# Patient Record
Sex: FEMALE | Race: WHITE | URBAN - METROPOLITAN AREA
[De-identification: names, ages, dates, MRNs, and addresses within clinical notes are randomized per-mention and may not be internally consistent; named-entity substitution may affect disease eponyms.]

---

## 2022-04-07 ENCOUNTER — APPOINTMENT (RX ONLY)
Dept: URBAN - METROPOLITAN AREA CLINIC 86 | Facility: CLINIC | Age: 77
Setting detail: DERMATOLOGY
End: 2022-04-07

## 2022-04-07 DIAGNOSIS — L82.1 OTHER SEBORRHEIC KERATOSIS: ICD-10-CM

## 2022-04-07 DIAGNOSIS — L57.0 ACTINIC KERATOSIS: ICD-10-CM

## 2022-04-07 DIAGNOSIS — L81.4 OTHER MELANIN HYPERPIGMENTATION: ICD-10-CM

## 2022-04-07 PROCEDURE — ? COUNSELING

## 2022-04-07 PROCEDURE — 99203 OFFICE O/P NEW LOW 30 MIN: CPT | Mod: 25

## 2022-04-07 PROCEDURE — 17000 DESTRUCT PREMALG LESION: CPT

## 2022-04-07 PROCEDURE — ? ADDITIONAL NOTES

## 2022-04-07 PROCEDURE — ? FULL BODY SKIN EXAM - DECLINED

## 2022-04-07 PROCEDURE — ? LIQUID NITROGEN

## 2022-04-07 PROCEDURE — 17003 DESTRUCT PREMALG LES 2-14: CPT

## 2022-04-07 ASSESSMENT — LOCATION SIMPLE DESCRIPTION DERM
LOCATION SIMPLE: LEFT FOREHEAD
LOCATION SIMPLE: LEFT CHEEK
LOCATION SIMPLE: RIGHT TEMPLE
LOCATION SIMPLE: RIGHT CHEEK
LOCATION SIMPLE: RIGHT NOSE

## 2022-04-07 ASSESSMENT — LOCATION ZONE DERM
LOCATION ZONE: NOSE
LOCATION ZONE: FACE

## 2022-04-07 ASSESSMENT — LOCATION DETAILED DESCRIPTION DERM
LOCATION DETAILED: RIGHT CENTRAL TEMPLE
LOCATION DETAILED: RIGHT NASAL SIDEWALL
LOCATION DETAILED: RIGHT INFERIOR CENTRAL MALAR CHEEK
LOCATION DETAILED: LEFT INFERIOR FOREHEAD
LOCATION DETAILED: LEFT INFERIOR CENTRAL MALAR CHEEK

## 2022-05-26 ENCOUNTER — APPOINTMENT (RX ONLY)
Dept: URBAN - METROPOLITAN AREA CLINIC 86 | Facility: CLINIC | Age: 77
Setting detail: DERMATOLOGY
End: 2022-05-26

## 2022-05-26 DIAGNOSIS — L57.0 ACTINIC KERATOSIS: ICD-10-CM | Status: RESOLVED

## 2022-05-26 DIAGNOSIS — L82.1 OTHER SEBORRHEIC KERATOSIS: ICD-10-CM

## 2022-05-26 PROCEDURE — ? FULL BODY SKIN EXAM - DECLINED

## 2022-05-26 PROCEDURE — 99213 OFFICE O/P EST LOW 20 MIN: CPT

## 2022-05-26 PROCEDURE — ? COUNSELING

## 2022-05-26 PROCEDURE — ? DEFER

## 2022-05-26 PROCEDURE — ? ADDITIONAL NOTES

## 2022-05-26 ASSESSMENT — LOCATION SIMPLE DESCRIPTION DERM
LOCATION SIMPLE: LEFT FOREHEAD
LOCATION SIMPLE: RIGHT NOSE
LOCATION SIMPLE: RIGHT TEMPLE

## 2022-05-26 ASSESSMENT — LOCATION DETAILED DESCRIPTION DERM
LOCATION DETAILED: RIGHT INFERIOR TEMPLE
LOCATION DETAILED: LEFT INFERIOR FOREHEAD
LOCATION DETAILED: RIGHT NASAL SIDEWALL

## 2022-05-26 ASSESSMENT — LOCATION ZONE DERM
LOCATION ZONE: NOSE
LOCATION ZONE: FACE

## 2023-04-10 NOTE — PROCEDURE: ADDITIONAL NOTES
Detail Level: Simple
Render Risk Assessment In Note?: no
Additional Notes: Patient consent was obtained to proceed with the visit and recommended plan of care after discussion of all risks and benefits, including the risks of COVID-19 exposure.
Doxycycline Pregnancy And Lactation Text: This medication is Pregnancy Category D and not consider safe during pregnancy. It is also excreted in breast milk but is considered safe for shorter treatment courses.

## 2023-09-12 ENCOUNTER — NEW PATIENT (OUTPATIENT)
Dept: URBAN - METROPOLITAN AREA CLINIC 50 | Facility: LOCATION | Age: 78
End: 2023-09-12

## 2023-09-12 DIAGNOSIS — H35.3222: ICD-10-CM

## 2023-09-12 DIAGNOSIS — H35.3111: ICD-10-CM

## 2023-09-12 DIAGNOSIS — H25.813: ICD-10-CM

## 2023-09-12 PROCEDURE — 99204 OFFICE O/P NEW MOD 45 MIN: CPT

## 2023-09-12 PROCEDURE — 92015 DETERMINE REFRACTIVE STATE: CPT

## 2023-09-12 PROCEDURE — 92134 CPTRZ OPH DX IMG PST SGM RTA: CPT

## 2023-09-12 ASSESSMENT — TONOMETRY
OS_IOP_MMHG: 16
OD_IOP_MMHG: 16

## 2023-09-12 ASSESSMENT — VISUAL ACUITY
OD_CC: 20/40
OD_GLARE: 20/40
OD_GLARE: 20/50
OS_CC: CF 2FT

## 2023-10-03 ENCOUNTER — PRE-OP/H&P (OUTPATIENT)
Dept: URBAN - METROPOLITAN AREA CLINIC 50 | Facility: LOCATION | Age: 78
End: 2023-10-03

## 2023-10-03 DIAGNOSIS — H25.813: ICD-10-CM

## 2023-10-03 PROCEDURE — PREOP PRE OP VISIT

## 2023-10-03 PROCEDURE — 92025-3 CORNEAL TOPO, REFUSED

## 2023-10-03 PROCEDURE — 92136 OPHTHALMIC BIOMETRY: CPT

## 2023-10-03 ASSESSMENT — KERATOMETRY
OD_AXISANGLE2_DEGREES: 108
OD_K2POWER_DIOPTERS: 44.37
OS_K1POWER_DIOPTERS: 46.00
OD_K1POWER_DIOPTERS: 46.00
OS_AXISANGLE2_DEGREES: 20
OS_K2POWER_DIOPTERS: 44.87
OS_AXISANGLE_DEGREES: 110
OD_AXISANGLE_DEGREES: 18

## 2023-10-03 ASSESSMENT — VISUAL ACUITY
OU_CC: 20/30-1
OD_CC: 20/30
OS_CC: CF 3FT

## 2023-10-03 ASSESSMENT — TONOMETRY
OD_IOP_MMHG: 18
OS_IOP_MMHG: 16

## 2023-10-10 ASSESSMENT — KERATOMETRY
OS_AXISANGLE_DEGREES: 110
OS_AXISANGLE2_DEGREES: 20
OS_K2POWER_DIOPTERS: 44.87
OD_K1POWER_DIOPTERS: 46.00
OD_AXISANGLE_DEGREES: 18
OD_K2POWER_DIOPTERS: 44.37
OS_K1POWER_DIOPTERS: 46.00
OD_AXISANGLE2_DEGREES: 108

## 2023-10-11 ENCOUNTER — SURGERY/PROCEDURE (OUTPATIENT)
Dept: URBAN - METROPOLITAN AREA SURGERY 16 | Facility: SURGERY | Age: 78
End: 2023-10-11

## 2023-10-11 ENCOUNTER — POST-OP (OUTPATIENT)
Dept: URBAN - METROPOLITAN AREA CLINIC 48 | Facility: LOCATION | Age: 78
End: 2023-10-11

## 2023-10-11 DIAGNOSIS — Z98.42: ICD-10-CM

## 2023-10-11 DIAGNOSIS — H25.812: ICD-10-CM

## 2023-10-11 DIAGNOSIS — Z96.1: ICD-10-CM

## 2023-10-11 PROCEDURE — 66984 XCAPSL CTRC RMVL W/O ECP: CPT

## 2023-10-11 ASSESSMENT — VISUAL ACUITY: OS_SC: CF 5FT

## 2023-10-11 ASSESSMENT — KERATOMETRY
OD_K1POWER_DIOPTERS: 46.00
OS_AXISANGLE2_DEGREES: 20
OD_AXISANGLE_DEGREES: 18
OD_AXISANGLE2_DEGREES: 108
OD_K2POWER_DIOPTERS: 44.37
OS_K2POWER_DIOPTERS: 44.87
OS_K1POWER_DIOPTERS: 46.00
OS_AXISANGLE_DEGREES: 110

## 2023-10-11 ASSESSMENT — TONOMETRY: OS_IOP_MMHG: 12

## 2023-10-19 ENCOUNTER — POST OP/EVAL OF SECOND EYE (OUTPATIENT)
Dept: URBAN - METROPOLITAN AREA CLINIC 52 | Facility: CLINIC | Age: 78
End: 2023-10-19

## 2023-10-19 DIAGNOSIS — H25.811: ICD-10-CM

## 2023-10-19 PROCEDURE — 99213 OFFICE O/P EST LOW 20 MIN: CPT | Mod: 24

## 2023-10-19 ASSESSMENT — TONOMETRY
OD_IOP_MMHG: 17
OS_IOP_MMHG: 17

## 2023-10-19 ASSESSMENT — KERATOMETRY
OD_K1POWER_DIOPTERS: 46.00
OS_K2POWER_DIOPTERS: 44.87
OD_AXISANGLE2_DEGREES: 108
OS_K1POWER_DIOPTERS: 46.00
OS_AXISANGLE2_DEGREES: 20
OD_AXISANGLE_DEGREES: 18
OD_K2POWER_DIOPTERS: 44.37
OS_AXISANGLE_DEGREES: 110

## 2023-10-19 ASSESSMENT — VISUAL ACUITY
OS_SC: CF 4FT
OD_SC: 20/200

## 2023-10-24 ASSESSMENT — KERATOMETRY
OS_K1POWER_DIOPTERS: 46.00
OD_K1POWER_DIOPTERS: 46.00
OS_K2POWER_DIOPTERS: 44.87
OD_AXISANGLE2_DEGREES: 108
OD_AXISANGLE_DEGREES: 18
OS_AXISANGLE_DEGREES: 110
OS_AXISANGLE2_DEGREES: 20
OD_K2POWER_DIOPTERS: 44.37

## 2023-10-25 ENCOUNTER — POST-OP (OUTPATIENT)
Dept: URBAN - METROPOLITAN AREA CLINIC 48 | Facility: LOCATION | Age: 78
End: 2023-10-25

## 2023-10-25 ENCOUNTER — SURGERY/PROCEDURE (OUTPATIENT)
Dept: URBAN - METROPOLITAN AREA SURGERY 16 | Facility: SURGERY | Age: 78
End: 2023-10-25

## 2023-10-25 DIAGNOSIS — Z98.41: ICD-10-CM

## 2023-10-25 DIAGNOSIS — Z96.1: ICD-10-CM

## 2023-10-25 DIAGNOSIS — Z98.42: ICD-10-CM

## 2023-10-25 DIAGNOSIS — H25.811: ICD-10-CM

## 2023-10-25 PROCEDURE — 99199PCV CUSTOM VISION

## 2023-10-25 PROCEDURE — 66984CV REMOVE CATARACT, INSERT LENS, CUSTOM VISION: Mod: 79,RT

## 2023-10-25 ASSESSMENT — KERATOMETRY
OD_K2POWER_DIOPTERS: 44.37
OS_AXISANGLE2_DEGREES: 20
OS_AXISANGLE_DEGREES: 110
OS_K2POWER_DIOPTERS: 44.87
OS_K1POWER_DIOPTERS: 46.00
OD_AXISANGLE2_DEGREES: 108
OD_K1POWER_DIOPTERS: 46.00
OD_AXISANGLE_DEGREES: 18

## 2023-10-25 ASSESSMENT — VISUAL ACUITY: OD_SC: 20/70-1

## 2023-10-25 ASSESSMENT — TONOMETRY: OD_IOP_MMHG: 14

## 2023-10-31 ENCOUNTER — POST-OP (OUTPATIENT)
Dept: URBAN - METROPOLITAN AREA CLINIC 50 | Facility: LOCATION | Age: 78
End: 2023-10-31

## 2023-10-31 DIAGNOSIS — Z98.41: ICD-10-CM

## 2023-10-31 DIAGNOSIS — Z96.1: ICD-10-CM

## 2023-10-31 DIAGNOSIS — H35.3111: ICD-10-CM

## 2023-10-31 PROCEDURE — 92134 CPTRZ OPH DX IMG PST SGM RTA: CPT

## 2023-10-31 PROCEDURE — 99024 POSTOP FOLLOW-UP VISIT: CPT

## 2023-10-31 ASSESSMENT — TONOMETRY
OD_IOP_MMHG: 17
OS_IOP_MMHG: 16

## 2023-10-31 ASSESSMENT — VISUAL ACUITY
OD_SC: 20/25
OD_SC: 20/40

## 2023-11-28 ENCOUNTER — POST-OP (OUTPATIENT)
Dept: URBAN - METROPOLITAN AREA CLINIC 50 | Facility: LOCATION | Age: 78
End: 2023-11-28

## 2023-11-28 DIAGNOSIS — Z98.42: ICD-10-CM

## 2023-11-28 DIAGNOSIS — Z98.41: ICD-10-CM

## 2023-11-28 DIAGNOSIS — Z96.1: ICD-10-CM

## 2023-11-28 DIAGNOSIS — H35.3222: ICD-10-CM

## 2023-11-28 DIAGNOSIS — H35.3111: ICD-10-CM

## 2023-11-28 PROCEDURE — 99024 POSTOP FOLLOW-UP VISIT: CPT

## 2023-11-28 PROCEDURE — 92134 CPTRZ OPH DX IMG PST SGM RTA: CPT

## 2023-11-28 ASSESSMENT — VISUAL ACUITY
OU_SC: 20/40-1
OS_SC: 20/400 ECC
OD_SC: 20/40
OU_SC: J5@16"
OD_PH: 20/25-1
OD_SC: 20/50@22"

## 2023-11-28 ASSESSMENT — KERATOMETRY
OD_AXISANGLE_DEGREES: 104
OD_K2POWER_DIOPTERS: 46.00
OS_AXISANGLE_DEGREES: 036
OS_AXISANGLE2_DEGREES: 126
OS_K2POWER_DIOPTERS: 46.50
OD_AXISANGLE2_DEGREES: 14
OD_K1POWER_DIOPTERS: 44.75
OS_K1POWER_DIOPTERS: 45.25

## 2023-11-28 ASSESSMENT — TONOMETRY
OS_IOP_MMHG: 15
OD_IOP_MMHG: 15